# Patient Record
(demographics unavailable — no encounter records)

---

## 2025-01-23 NOTE — HISTORY OF PRESENT ILLNESS
Chief Complaint   Patient presents with   • HTN (Controlled)     & Aortic valve disorder    • Hyperlipidemia       Subjective:   Dieter Marie is a 71 y.o. male who presents today as a follow-up for his aortic stenosis hypertension hyperlipidemia and CAD.  Since he was last seen his LDL is down to 53.  Has no chest pain palpitations or PND.  His chest pain [place] the [place] has not returned.  He is started on medications for his Parkinson's.  He was seen at [place] for evaluation for a deep brain stimulator which is likely a years off in terms of his needs.  His last echocardiogram continue to show mild aortic stenosis with mild to trace aortic regurgitation.  His ejection fraction was normal.    Past Medical History:   Diagnosis Date   • Anginal syndrome (HCC)    • Arthritis     knees   • Heart burn    • High cholesterol    • Hypertension    • Pain     lower back down both legs   • Parkinson disease (HCC)    • Spinal stenosis      Past Surgical History:   Procedure Laterality Date   • PB TOTAL KNEE ARTHROPLASTY Left 11/27/2019    Procedure: ARTHROPLASTY, KNEE, TOTAL;  Surgeon: Saw Travis M.D.;  Location: SURGERY [facility];  Service: Orthopedics   • STENT PLACEMENT  03/31/2017    [facility]CA   • LUMBAR FUSION POSTERIOR  2/5/2015    Performed by Turner Walker M.D. at SURGERY [facility]S   • BLEPHAROPLASTY  12/14/2012    Performed by Jayme German M.D. at SURGERY [facility]   • OTHER NEUROLOGICAL SURG  2001    spinal fusion   • ACL RECONSTRUCTION Right 1994    ACL transplant right  knee   • TONSILLECTOMY      as a child     Family History   Problem Relation Age of Onset   • Other Mother         SPINAL STENOSIS   • Heart Disease Father         HAD STENT   • No Known Problems Brother    • Stroke Maternal Grandmother    • Cancer Maternal Grandfather         prostate cancer   • Heart Disease Paternal Grandmother    • Other Paternal Grandfather         emphesema, worked in cold  mine   • No Known Problems Brother    • No Known Problems Daughter      Social History     Socioeconomic History   • Marital status:      Spouse name: Not on file   • Number of children: Not on file   • Years of education: Not on file   • Highest education level: Not on file   Occupational History   • Not on file   Social Needs   • Financial resource strain: Not on file   • Food insecurity     Worry: Not on file     Inability: Not on file   • Transportation needs     Medical: Not on file     Non-medical: Not on file   Tobacco Use   • Smoking status: Never Smoker   • Smokeless tobacco: Never Used   Substance and Sexual Activity   • Alcohol use: Yes     Alcohol/week: 1.8 oz     Types: 3 Glasses of wine per week     Frequency: 2-4 times a month     Comment: 2-3 per day   • Drug use: Yes     Types: Marijuana     Comment: CBD for back pain    • Sexual activity: Yes     Partners: Female   Lifestyle   • Physical activity     Days per week: Not on file     Minutes per session: Not on file   • Stress: Not on file   Relationships   • Social connections     Talks on phone: Not on file     Gets together: Not on file     Attends Taoism service: Not on file     Active member of club or organization: Not on file     Attends meetings of clubs or organizations: Not on file     Relationship status: Not on file   • Intimate partner violence     Fear of current or ex partner: Not on file     Emotionally abused: Not on file     Physically abused: Not on file     Forced sexual activity: Not on file   Other Topics Concern   • Not on file   Social History Narrative   • Not on file     No Known Allergies  Outpatient Encounter Medications as of 6/15/2020   Medication Sig Dispense Refill   • nitroglycerin (NITROSTAT) 0.4 MG SL Tab PLACE 1 TAB UNDER TONGUE AS NEEDED FOR CHEST PAIN. 25 Tab 5   • amLODIPine (NORVASC) 10 MG Tab TAKE 1 TABLET BY MOUTH EVERY DAY IN THE EVENING 90 Tab 0   • losartan (COZAAR) 100 MG Tab Take 1 Tab by mouth  every day. 100 Tab 3   • hydroCHLOROthiazide (HYDRODIURIL) 25 MG Tab Take 1 Tab by mouth every morning. 90 Tab 3   • aspirin EC (ECOTRIN) 81 MG Tablet Delayed Response Take 1 Tab by mouth 2 times a day, with meals. 90 Tab 0   • triamcinolone acetonide (KENALOG) 0.1 % Ointment APPLY TO AFFECTED AREAS TWICE DAILY FOR UP TO 3 WEEKS AS NEEDED, AS DIRECTED.  1   • carbidopa-levodopa CR (SINEMET CR)  MG per tablet Take 1 Tab by mouth 2 Times a Day. (Patient taking differently: Take 1 Tab by mouth 2 Times a Day. 200mg Three times daily) 180 Tab 1   • rosuvastatin (CRESTOR) 40 MG tablet TAKE 1 TABLET BY MOUTH AT BEDTIME. Pts ins is requesting 100 day supply. Thank you (Patient taking differently: every evening. TAKE 1 TABLET BY MOUTH AT BEDTIME. Pts ins is requesting 100 day supply. Thank you) 100 Tab 3   • Cyanocobalamin (VITAMIN B 12 PO) Take  by mouth every morning.     • ALPRAZolam (XANAX) 0.25 MG Tab Take 0.25 mg by mouth at bedtime as needed for Sleep.     • sildenafil citrate (VIAGRA) 25 MG Tab Take 25 mg by mouth as needed for Erectile Dysfunction.     • Carbidopa-Levodopa  MG TABLET DISPERSIBLE      • [DISCONTINUED] tramadol (ULTRAM) 50 MG Tab TAKE 1 TO 2 TABLETS BY MOUTH EVERY 6 HOURS AS NEEDED FOR PAIN  0   • [DISCONTINUED] ibuprofen (MOTRIN) 200 MG Tab Take 200 mg by mouth every 6 hours as needed.       No facility-administered encounter medications on file as of 6/15/2020.      Review of Systems   Constitutional: Negative.  Negative for chills, fever and malaise/fatigue.   HENT: Negative.  Negative for sore throat.    Eyes: Negative.    Respiratory: Negative.  Negative for cough, hemoptysis, sputum production, shortness of breath, wheezing and stridor.    Cardiovascular: Negative.  Negative for chest pain, palpitations, orthopnea, claudication, leg swelling and PND.   Gastrointestinal: Negative.    Genitourinary: Negative.    Musculoskeletal: Negative.    Skin: Negative.    Neurological: Negative.   "Negative for dizziness, loss of consciousness and weakness.   Endo/Heme/Allergies: Negative.  Does not bruise/bleed easily.   All other systems reviewed and are negative.       Objective:   /58 (BP Location: Left arm, Patient Position: Sitting, BP Cuff Size: Adult)   Pulse 73   Resp 16   Ht 1.651 m (5' 5\")   Wt 82.1 kg (181 lb)   SpO2 94%   BMI 30.12 kg/m²     Physical Exam   Constitutional: He appears well-developed and well-nourished. No distress.   HENT:   Head: Normocephalic and atraumatic.   Right Ear: External ear normal.   Left Ear: External ear normal.   Nose: Nose normal.   Mouth/Throat: No oropharyngeal exudate.   Eyes: Pupils are equal, round, and reactive to light. Conjunctivae and EOM are normal. Right eye exhibits no discharge. Left eye exhibits no discharge. No scleral icterus.   Neck: Neck supple. No JVD present.   Cardiovascular: Normal rate, regular rhythm and intact distal pulses. Exam reveals no gallop and no friction rub.   No murmur heard.  Pulmonary/Chest: Effort normal. No stridor. No respiratory distress. He has no wheezes. He has no rales. He exhibits no tenderness.   Abdominal: Soft. He exhibits no distension. There is no guarding.   Musculoskeletal: Normal range of motion.         General: No tenderness, deformity or edema.   Neurological: He is alert. He has normal reflexes. He displays normal reflexes. No cranial nerve deficit. He exhibits normal muscle tone. Coordination normal.   Skin: Skin is warm and dry. No rash noted. He is not diaphoretic. No erythema. No pallor.   Psychiatric: He has a normal mood and affect. His behavior is normal. Judgment and thought content normal.   Nursing note and vitals reviewed.      Assessment:     1. Aortic valve disorder     2. Essential hypertension     3. Essential tremor     4. Exercise-induced angina (HCC)     5. History of coronary artery stent placement     6. Mild aortic sclerosis (HCC)     7. Mixed hyperlipidemia     8. " Parkinson's disease (HCC)         Medical Decision Making:  Today's Assessment / Status / Plan:     71-year-old male with hypertension and lipidemia aortic stenosis.  At this point he does not need any further testing.  We can, the same medications.  His aortic stenosis continues to be mild.  He is asymptomatic from this.  We can see him back in 6 months.    1. CAD s/p stent to RCA Mar 2017    - asa    - atorva 40     2. HLD    - last LDL 71     3. HTN     - at goal     4. Chronic Angina    - PRN nitro     5. Aortic stenosis, mild to moderate    - mild by last echo   [FreeTextEntry1] : 58 y/o F presents for 2 days of left leg and foot swelling, no trauma, swelling improved today as she has been elevating the leg, no pain no h/o DVT, denies any trauma, no similar symptoms in the past.

## 2025-01-23 NOTE — PHYSICAL EXAM
[2+] : left 2+ [Ankle Swelling (On Exam)] : not present [Ankle Swelling Bilaterally] : bilaterally  [Varicose Veins Of Lower Extremities] : bilaterally [] : bilaterally [FreeTextEntry1] : No cellulitis, no open wounds

## 2025-01-23 NOTE — ASSESSMENT
[FreeTextEntry1] : 58 y/o F with 2 days of left leg swelling and tightness, swelling now resolved.  Duplex showed superficial phlebitis in the left SSV, no evidence of DVT.  Advised warm compresses and NSAIDs. Pt states she cannot take NSAIDs due to stomach problems. Compression stocking use daily, activity as tolerated and stay hydrated.  She would like to f/u in 6 months for repeat venous duplex. She was also instructed to f/u sooner if symptoms worsen.

## 2025-01-23 NOTE — DATA REVIEWED
[FreeTextEntry1] : I performed a venous duplex which was medically necessary to evaluate for DVT. It showed superficial phlebitis in the left SSV, no evidence of DVT.